# Patient Record
Sex: FEMALE | Race: WHITE
[De-identification: names, ages, dates, MRNs, and addresses within clinical notes are randomized per-mention and may not be internally consistent; named-entity substitution may affect disease eponyms.]

---

## 2018-11-25 ENCOUNTER — HOSPITAL ENCOUNTER (EMERGENCY)
Dept: HOSPITAL 47 - EC | Age: 83
Discharge: HOME | End: 2018-11-25
Payer: MEDICARE

## 2018-11-25 VITALS — HEART RATE: 65 BPM | DIASTOLIC BLOOD PRESSURE: 77 MMHG | SYSTOLIC BLOOD PRESSURE: 172 MMHG

## 2018-11-25 VITALS — RESPIRATION RATE: 18 BRPM | TEMPERATURE: 98 F

## 2018-11-25 DIAGNOSIS — M10.9: ICD-10-CM

## 2018-11-25 DIAGNOSIS — Z94.7: ICD-10-CM

## 2018-11-25 DIAGNOSIS — J40: Primary | ICD-10-CM

## 2018-11-25 DIAGNOSIS — I10: ICD-10-CM

## 2018-11-25 DIAGNOSIS — Z87.891: ICD-10-CM

## 2018-11-25 DIAGNOSIS — J32.9: ICD-10-CM

## 2018-11-25 DIAGNOSIS — H40.9: ICD-10-CM

## 2018-11-25 DIAGNOSIS — Z96.641: ICD-10-CM

## 2018-11-25 DIAGNOSIS — M19.90: ICD-10-CM

## 2018-11-25 DIAGNOSIS — H54.61: ICD-10-CM

## 2018-11-25 DIAGNOSIS — Z88.2: ICD-10-CM

## 2018-11-25 DIAGNOSIS — Z79.899: ICD-10-CM

## 2018-11-25 DIAGNOSIS — Z88.6: ICD-10-CM

## 2018-11-25 PROCEDURE — 87502 INFLUENZA DNA AMP PROBE: CPT

## 2018-11-25 PROCEDURE — 99283 EMERGENCY DEPT VISIT LOW MDM: CPT

## 2018-11-25 PROCEDURE — 71046 X-RAY EXAM CHEST 2 VIEWS: CPT

## 2018-11-25 NOTE — ED
General Adult HPI





- General


Chief complaint: Upper Respiratory Infection


Stated complaint: congestion


Time Seen by Provider: 11/25/18 14:03


Source: patient, family, RN notes reviewed


Mode of arrival: ambulatory


Limitations: no limitations





- History of Present Illness


Initial comments: 





Patient is a pleasant 87-year-old female presenting to the emergency department 

with concern for cough and congestion.  Symptoms have been present for the past 

3 days.  Patient feels congested in her chest.  Patient has had cough that is 

been dry nonproductive.  Patient also has sinus congestion.  Patient states her 

throat feels irritated however not painful.  No chest pain or dyspnea.  Patient 

has not been eating quite as much is normal.  Patient does not normally drink 

enough fluids.





- Related Data


 Home Medications











 Medication  Instructions  Recorded  Confirmed


 


Vits A,C,E/Lutein/Minerals 1 tab PO DAILY 03/27/15 11/25/18





[Ocuvite with Lutein Tablet]   


 


Atenolol [Tenormin] 50 mg PO DAILY 05/19/16 11/25/18


 


Beta-Carotene [Beta Carotene] 25,000 unit PO DAILY 05/19/16 11/25/18


 


Enalapril [Vasotec] 20 mg PO BID 06/01/16 11/25/18


 


Cholecalciferol [Vitamin D3] 1,000 unit PO DAILY 11/25/18 11/25/18


 


Cranberry Fruit Extract [Cranberry] 200 mg PO DAILY 11/25/18 11/25/18


 


NIFEdipine [NIFEdipine ER] 60 mg PO DAILY 11/25/18 11/25/18


 


Prednisolone Acetate/Pf 1 drop LEFT EYE QID 11/25/18 11/25/18





[Prednisolone Acet 1% Eye Drop]   


 


Promethazine 6.25MG/5Ml [Phenergan 6.25 mg PO Q6HR PRN 11/25/18 11/25/18





Syrup]   


 


Zolpidem [Ambien] 5 mg PO HS 11/25/18 11/25/18








 Previous Rx's











 Medication  Instructions  Recorded


 


LORazepam [Ativan] 0.5 mg PO BID PRN #60 tab 05/25/16


 


Azithromycin [Zithromax Z-pack] 250 mg PO AS DIRECTED #6 tab 11/25/18











 Allergies











Allergy/AdvReac Type Severity Reaction Status Date / Time


 


Sulfa (Sulfonamide Allergy  Swelling Verified 11/25/18 14:13





Antibiotics)     


 


aspirin AdvReac  Nausea & Verified 11/25/18 14:13





   Vomiting  














Review of Systems


ROS Statement: 


Those systems with pertinent positive or pertinent negative responses have been 

documented in the HPI.





ROS Other: All systems not noted in ROS Statement are negative.


Constitutional: Reports: chills


Eyes: Denies: eye pain


ENT: Reports: congestion.  Denies: ear pain


Respiratory: Reports: cough


Cardiovascular: Denies: chest pain


Endocrine: Reports: fatigue


Gastrointestinal: Reports: nausea.  Denies: abdominal pain, vomiting


Genitourinary: Denies: dysuria


Musculoskeletal: Denies: back pain


Skin: Denies: rash


Neurological: Denies: confusion





Past Medical History


Past Medical History: Eye Disorder, Hyperlipidemia, Hypertension, 

Osteoarthritis (OA), Pneumonia


Additional Past Medical History / Comment(s): Pt had recent admit to [facility] on 5/1 /16 tx for CDiff.    Other HX:  Diverticulosis, colitis, HTN cardiovascular 

disease, L ventricular hypertrophy, blind in right eye, bilateral macular 

degeneration, R eye corneal dystrophy, L eye glaucoma, vitamin D deficiency, 

gout, UTI, hx of 3 right rib fx with R pneumothorax and chest tube, 2011 

pneumonia.


History of Any Multi-Drug Resistant Organisms: C-DIFF


Date of last positivie culture/infection: 05/20/16


MDRO Source:: stool


Past Surgical History: Appendectomy, Cholecystectomy, Hysterectomy, Joint 

Replacement


Additional Past Surgical History / Comment(s): R hip replacement-severed nerve, 

R eye retinal rettachment sx, bilateral cataract surgery 2012, right corneal 

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


Past Anesthesia/Blood Transfusion Reactions: No Reported Reaction


Past Psychological History: Anxiety, Depression


Smoking Status: Former smoker


Past Alcohol Use History: Daily


Past Drug Use History: None Reported





- Past Family History


  ** Mother


Family Medical History: Coronary Artery Disease (CAD)


Additional Family Medical History / Comment(s): pt. states her mother passed 

away at the age of 86 due to heart disease





  ** Father


Family Medical History: Coronary Artery Disease (CAD), Myocardial Infarction (MI

)


Additional Family Medical History / Comment(s): pt. states her father passed 

away at the age of 94 from heart disease





General Exam


Limitations: no limitations


General appearance: alert, in no apparent distress


Head exam: Present: atraumatic


Eye exam: Present: other (Right eye opacified)


ENT exam: Present: normal oropharynx


Neck exam: Present: normal inspection


Respiratory exam: Present: normal lung sounds bilaterally.  Absent: respiratory 

distress, wheezes, chest wall tenderness


Cardiovascular Exam: Present: regular rate, normal rhythm


GI/Abdominal exam: Present: soft.  Absent: tenderness


Extremities exam: Present: normal inspection.  Absent: pedal edema, calf 

tenderness


Neurological exam: Present: alert


Psychiatric exam: Present: normal affect, normal mood


Skin exam: Present: normal color





Course


 Vital Signs











  11/25/18 11/25/18





  13:55 15:02


 


Temperature 98.0 F 


 


Pulse Rate 64 65


 


Respiratory 18 18





Rate  


 


Blood Pressure 151/66 172/77


 


O2 Sat by Pulse 96 96





Oximetry  














- Reevaluation(s)


Reevaluation #1: 





11/25/18 14:26


Discussion had with patient and son and their decision at this point was just 

to do a flu swab and chest x-ray.  They do not want IV or blood work done at 

this time.





Medical Decision Making





- Medical Decision Making





Patient reevaluated.  Patient and family updated.





- Lab Data


 Lab Results











  11/25/18 Range/Units





  14:25 


 


Influenza Type A RNA  Not Detected  (Not Detectd)  


 


Influenza Type B (PCR)  Not Detected  (Not Detectd)  














- Radiology Data


Radiology results: image reviewed (Chest x-ray shows changes consistent with 

COPD.  No acute findings.)





Disposition


Clinical Impression: 


 Bronchitis, Sinusitis





Disposition: HOME SELF-CARE


Condition: Stable


Instructions:  Sinusitis (ED), Acute Bronchitis (ED)


Additional Instructions: 


Please follow-up with primary care physician in the next day or 2 for recheck.  

Return for reverse, difficulty breathing, chest pain, worsening or changing 

symptoms or other concerns.


Prescriptions: 


Azithromycin [Zithromax Z-pack] 250 mg PO AS DIRECTED #6 tab


Is patient prescribed a controlled substance at d/c from ED?: No


Referrals: 


Ambrocio Braun MD [Primary Care Provider] - 1-2 days


Time of Disposition: 15:31

## 2018-11-25 NOTE — XR
EXAMINATION TYPE: XR chest 2V

 

DATE OF EXAM: 11/25/2018

 

COMPARISON: 6/6/2016

 

HISTORY: Cough and congestion

 

TECHNIQUE:  Frontal and lateral views of the chest are obtained.

 

FINDINGS:  There is pulmonary hyperinflation seen bilaterally indicative of underlying COPD. Right ba
sal atelectasis with hemidiaphragm tenting is noted. No focal consolidation to suggest pneumonia. Mod
erate multilevel degenerative changes of the thoracic spine are noted. Cardia mediastinal silhouette 
is upper limits of normal unchanged from the prior. There is mild diffuse osseous demineralization.

 

IMPRESSION:  Radiographic sequela of COPD. No acute cardiopulmonary process. No radiographic findings
 to suggest pneumonia.